# Patient Record
Sex: FEMALE | Race: WHITE | NOT HISPANIC OR LATINO | Employment: OTHER | URBAN - METROPOLITAN AREA
[De-identification: names, ages, dates, MRNs, and addresses within clinical notes are randomized per-mention and may not be internally consistent; named-entity substitution may affect disease eponyms.]

---

## 2017-12-07 ASSESSMENT — VISUAL ACUITY
OD_SC: 20/20
OS_SC: J2-1
OD_SC: J8
OS_SC: 20/25+1

## 2017-12-11 ENCOUNTER — ESTABLISHED COMPREHENSIVE EXAM (OUTPATIENT)
Dept: URBAN - METROPOLITAN AREA CLINIC 36 | Facility: CLINIC | Age: 67
End: 2017-12-11

## 2017-12-11 DIAGNOSIS — Z96.1: ICD-10-CM

## 2017-12-11 DIAGNOSIS — H04.123: ICD-10-CM

## 2017-12-11 DIAGNOSIS — H35.371: ICD-10-CM

## 2017-12-11 PROCEDURE — 92014 COMPRE OPH EXAM EST PT 1/>: CPT

## 2017-12-11 PROCEDURE — 92015 DETERMINE REFRACTIVE STATE: CPT

## 2017-12-11 PROCEDURE — G8427 DOCREV CUR MEDS BY ELIG CLIN: HCPCS

## 2017-12-11 ASSESSMENT — TONOMETRY
OS_IOP_MMHG: 12
OD_IOP_MMHG: 10

## 2018-12-12 ENCOUNTER — ESTABLISHED COMPREHENSIVE EXAM (OUTPATIENT)
Dept: URBAN - METROPOLITAN AREA CLINIC 36 | Facility: CLINIC | Age: 68
End: 2018-12-12

## 2018-12-12 DIAGNOSIS — H52.7: ICD-10-CM

## 2018-12-12 PROCEDURE — 92014 COMPRE OPH EXAM EST PT 1/>: CPT

## 2018-12-12 PROCEDURE — 92015 DETERMINE REFRACTIVE STATE: CPT

## 2018-12-12 ASSESSMENT — VISUAL ACUITY
OD_CC: J2
OS_SC: 20/30
OD_SC: 20/20-2
OS_CC: J2
OS_SC: J6
OD_SC: J6

## 2018-12-12 ASSESSMENT — TONOMETRY
OS_IOP_MMHG: 14
OD_IOP_MMHG: 14

## 2019-12-12 ENCOUNTER — ESTABLISHED COMPREHENSIVE EXAM (OUTPATIENT)
Dept: URBAN - METROPOLITAN AREA CLINIC 36 | Facility: CLINIC | Age: 69
End: 2019-12-12

## 2019-12-12 DIAGNOSIS — H52.7: ICD-10-CM

## 2019-12-12 PROCEDURE — 92015 DETERMINE REFRACTIVE STATE: CPT

## 2019-12-12 PROCEDURE — 92014 COMPRE OPH EXAM EST PT 1/>: CPT

## 2019-12-12 ASSESSMENT — VISUAL ACUITY
OD_SC: J12
OD_CC: J1
OS_SC: 20/20-1
OS_CC: J1
OS_SC: J8
OD_SC: 20/20-2

## 2019-12-12 ASSESSMENT — TONOMETRY
OD_IOP_MMHG: 13
OS_IOP_MMHG: 13

## 2020-08-20 NOTE — PATIENT DISCUSSION
New Prescription: Maxitrol (neomycin-polymyxin-dexameth): ointment: 3.5-10,000-0.1 mg-unit/g-% a small amount three times a day as directed into right eye 08-

## 2020-08-20 NOTE — PATIENT DISCUSSION
right side:  Elevated interoculat iop . gave samoples of Combigan bid od and lumigan qhs od , prescribed Maxitrol ointment apply 3 times daily in od and take Keflex 500 mg oral 1po 3 times daily until follow up on Monday .

## 2020-08-24 NOTE — PATIENT DISCUSSION
Periorbital cellulitis od :  Continue Combigan bid od and Lumigan qhs od .  Patient was also instructed to continue the Keflex 1 po 3 times daily until 1 wk follow up

## 2020-08-24 NOTE — PATIENT DISCUSSION
Continue: Maxitrol (neomycin-polymyxin-dexameth): ointment: 3.5-10,000-0.1 mg-unit/g-% a small amount three times a day as directed into right eye 08-

## 2020-08-31 NOTE — PATIENT DISCUSSION
Periorbital cellulitis   : Stop Maxitrol ointment and continue Keflex 500mg as prescribed until bottle is gone . Continue Lumigan qhs OD  and Combigan bid OD .  FOLLOW BACK UP IN 1 WEEK

## 2020-08-31 NOTE — PATIENT DISCUSSION
Stopped Today: Maxitrol (neomycin-polymyxin-dexameth): ointment: 3.5-10,000-0.1 mg-unit/g-% a small amount three times a day as directed into right eye 08-

## 2020-08-31 NOTE — PATIENT DISCUSSION
Continue: neomycin-polymyxin-dexameth (neomycin-polymyxin-dexameth): ointment: 3.5-10,000-0.1 mg-unit/g-% 08-

## 2020-09-09 NOTE — PATIENT DISCUSSION
CELLULITIS FOLLOWUP: MUCH IMPROVED SINCE LAST VISIT. MOM SAYS THAT LIDS ARE MORE OPEN. CONTINUE ALL DROPS AS DIRECTED.  WILL SEE BACK IN 3 WEEKS

## 2020-09-28 NOTE — PATIENT DISCUSSION
POAG, OU: ELEVATED INTRAOCULAR PRESSURE, OU. DILATED EXAM WAS PREFORMES TODAY AND SHOWS SIGNIFICANT OPTIC NERVE CUPPING . IOP IS STILL ELEVATED TODAY . INSTRUCTED TO CONTINUE COMBIGAN 1 GTT BID OU AS INSTRUCTED .  WILL FOLLOW

## 2020-09-28 NOTE — PATIENT DISCUSSION
Stopped Today: neomycin-polymyxin-dexameth (neomycin-polymyxin-dexameth): ointment: 3.5-10,000-0.1 mg-unit/g-% 08-

## 2020-09-28 NOTE — PATIENT DISCUSSION
Periorbital cellulitis OD : RESOLVED ON EXAM TODAY , CONTINUE USING THE ARTIFICIAL TEARS 2-3 TIMES DAILY .  WILL FOLLOW

## 2020-12-14 ENCOUNTER — ESTABLISHED COMPREHENSIVE EXAM (OUTPATIENT)
Dept: URBAN - METROPOLITAN AREA CLINIC 36 | Facility: CLINIC | Age: 70
End: 2020-12-14

## 2020-12-14 DIAGNOSIS — H35.371: ICD-10-CM

## 2020-12-14 DIAGNOSIS — Z96.1: ICD-10-CM

## 2020-12-14 DIAGNOSIS — H52.7: ICD-10-CM

## 2020-12-14 DIAGNOSIS — H26.493: ICD-10-CM

## 2020-12-14 PROCEDURE — 92015 DETERMINE REFRACTIVE STATE: CPT

## 2020-12-14 PROCEDURE — 92014 COMPRE OPH EXAM EST PT 1/>: CPT

## 2020-12-14 ASSESSMENT — TONOMETRY
OS_IOP_MMHG: 15
OD_IOP_MMHG: 15

## 2020-12-14 ASSESSMENT — VISUAL ACUITY
OD_SC: J10
OS_SC: 20/25-2
OD_SC: 20/20-1
OD_CC: J1
OS_CC: J1
OS_SC: J8

## 2020-12-28 NOTE — PATIENT DISCUSSION
New Prescription: latanoprost (PF) (latanoprost (pf)): drops: 0.005% 1 drop at bedtime as directed into both eyes 12-

## 2020-12-28 NOTE — PATIENT DISCUSSION
POAG OU: IOP IS DOWN BUT STILL SLIGHTLY ELEVATED. PATIENT IS TO BRING DROPS AT NEXT VISIT. PATIENT IS TO USE COMBIGAN BID OU AND LATANOPROST QHS OU.

## 2020-12-28 NOTE — PATIENT DISCUSSION
Stopped Today: timolol maleate (timolol maleate): drops: 0.5% 1 drop twice a day as directed into right eye 10-

## 2020-12-28 NOTE — PATIENT DISCUSSION
Stopped Today: brimonidine (brimonidine): drops: 0.2% 1 drop twice a day as directed into right eye 10-

## 2021-03-29 NOTE — PATIENT DISCUSSION
Continue: latanoprost (PF) (latanoprost (pf)): drops: 0.005% 1 drop at bedtime as directed into both eyes 12-

## 2021-08-26 NOTE — PATIENT DISCUSSION
Stopped Today: Combigan (brimonidine-timolol): drops: 0.2-0.5% 1 drop twice a day as directed into both eyes 03-

## 2021-12-27 ENCOUNTER — COMPREHENSIVE EXAM (OUTPATIENT)
Dept: URBAN - METROPOLITAN AREA CLINIC 36 | Facility: CLINIC | Age: 71
End: 2021-12-27

## 2021-12-27 DIAGNOSIS — H35.373: ICD-10-CM

## 2021-12-27 DIAGNOSIS — H52.7: ICD-10-CM

## 2021-12-27 DIAGNOSIS — Z96.1: ICD-10-CM

## 2021-12-27 DIAGNOSIS — H26.493: ICD-10-CM

## 2021-12-27 PROCEDURE — 92015 DETERMINE REFRACTIVE STATE: CPT

## 2021-12-27 PROCEDURE — 92134 CPTRZ OPH DX IMG PST SGM RTA: CPT

## 2021-12-27 PROCEDURE — 92014 COMPRE OPH EXAM EST PT 1/>: CPT

## 2021-12-27 ASSESSMENT — VISUAL ACUITY
OD_SC: J3
OD_SC: 20/20-1
OS_SC: 20/25
OS_CC: J1+
OD_CC: J1
OS_SC: J3

## 2021-12-27 ASSESSMENT — TONOMETRY
OD_IOP_MMHG: 09
OS_IOP_MMHG: 10

## 2022-03-10 NOTE — PATIENT DISCUSSION
IOP improved. Continue latanoprost qhs ou and combigan bid ou as directed.  Will see back in 4 months for full exam.

## 2022-06-30 ENCOUNTER — COMPREHENSIVE EXAM (OUTPATIENT)
Dept: URBAN - METROPOLITAN AREA CLINIC 36 | Facility: CLINIC | Age: 72
End: 2022-06-30

## 2022-06-30 DIAGNOSIS — Z96.1: ICD-10-CM

## 2022-06-30 DIAGNOSIS — H52.7: ICD-10-CM

## 2022-06-30 DIAGNOSIS — H04.123: ICD-10-CM

## 2022-06-30 DIAGNOSIS — H26.493: ICD-10-CM

## 2022-06-30 DIAGNOSIS — H35.373: ICD-10-CM

## 2022-06-30 PROCEDURE — 92134 CPTRZ OPH DX IMG PST SGM RTA: CPT

## 2022-06-30 PROCEDURE — 92012 INTRM OPH EXAM EST PATIENT: CPT

## 2022-06-30 PROCEDURE — 92015 DETERMINE REFRACTIVE STATE: CPT

## 2022-06-30 ASSESSMENT — VISUAL ACUITY
OS_SC: 20/30-1
OD_SC: J8
OS_SC: J3
OD_CC: J2
OS_CC: J1
OD_SC: 20/20-2

## 2022-06-30 ASSESSMENT — TONOMETRY
OD_IOP_MMHG: 13
OS_IOP_MMHG: 13

## 2022-07-19 NOTE — PATIENT DISCUSSION
IOP ELEVATED TODAY AND IT IS CONCERNING DUE TO THE SIZE THE OPTIC NERVES , pATIENT HAS BEEN INSTRUCTED TO CONTINUE COMBIGAN BID OU AND LATANOPROST QHS OU  AND ADDING RHOPRESSA QHS OU ALSO .

## 2022-09-15 NOTE — PATIENT DISCUSSION
IOP improved. Patient to continue combigan bid ou and latanoprost qhs ou. could not afford rhopressa but iop better so ok to not use.

## 2023-01-24 ENCOUNTER — COMPREHENSIVE EXAM (OUTPATIENT)
Dept: URBAN - METROPOLITAN AREA CLINIC 36 | Facility: CLINIC | Age: 73
End: 2023-01-24

## 2023-01-24 DIAGNOSIS — Z96.1: ICD-10-CM

## 2023-01-24 DIAGNOSIS — H52.7: ICD-10-CM

## 2023-01-24 DIAGNOSIS — H04.123: ICD-10-CM

## 2023-01-24 DIAGNOSIS — H35.373: ICD-10-CM

## 2023-01-24 DIAGNOSIS — H26.493: ICD-10-CM

## 2023-01-24 PROCEDURE — 92015 DETERMINE REFRACTIVE STATE: CPT

## 2023-01-24 PROCEDURE — 92014 COMPRE OPH EXAM EST PT 1/>: CPT

## 2023-01-24 PROCEDURE — 92134 CPTRZ OPH DX IMG PST SGM RTA: CPT

## 2023-01-24 ASSESSMENT — VISUAL ACUITY
OS_SC: 20/30
OD_SC: J12
OS_CC: J3
OD_SC: 20/20-1
OS_SC: J10
OD_CC: J2

## 2023-01-24 ASSESSMENT — TONOMETRY
OD_IOP_MMHG: 13
OS_IOP_MMHG: 13

## 2024-01-25 ENCOUNTER — COMPREHENSIVE EXAM (OUTPATIENT)
Dept: URBAN - METROPOLITAN AREA CLINIC 36 | Facility: CLINIC | Age: 74
End: 2024-01-25

## 2024-01-25 DIAGNOSIS — H52.7: ICD-10-CM

## 2024-01-25 DIAGNOSIS — H04.123: ICD-10-CM

## 2024-01-25 DIAGNOSIS — H26.493: ICD-10-CM

## 2024-01-25 DIAGNOSIS — H35.373: ICD-10-CM

## 2024-01-25 DIAGNOSIS — Z96.1: ICD-10-CM

## 2024-01-25 PROCEDURE — 92015 DETERMINE REFRACTIVE STATE: CPT

## 2024-01-25 PROCEDURE — 92014 COMPRE OPH EXAM EST PT 1/>: CPT

## 2024-01-25 ASSESSMENT — VISUAL ACUITY
OD_SC: J5
OD_CC: J1+
OS_SC: J5
OU_SC: J3
OD_SC: 20/20
OU_SC: 20/15
OS_CC: J1+
OU_CC: J1+
OS_SC: 20/20

## 2024-01-25 ASSESSMENT — TONOMETRY
OS_IOP_MMHG: 10
OD_IOP_MMHG: 13

## 2025-01-30 ENCOUNTER — COMPREHENSIVE EXAM (OUTPATIENT)
Age: 75
End: 2025-01-30

## 2025-01-30 DIAGNOSIS — H26.493: ICD-10-CM

## 2025-01-30 DIAGNOSIS — H35.373: ICD-10-CM

## 2025-01-30 DIAGNOSIS — Z96.1: ICD-10-CM

## 2025-01-30 DIAGNOSIS — H04.123: ICD-10-CM

## 2025-01-30 PROCEDURE — 92134 CPTRZ OPH DX IMG PST SGM RTA: CPT

## 2025-01-30 PROCEDURE — 92014 COMPRE OPH EXAM EST PT 1/>: CPT
